# Patient Record
Sex: FEMALE | Race: WHITE | NOT HISPANIC OR LATINO | Employment: FULL TIME | URBAN - METROPOLITAN AREA
[De-identification: names, ages, dates, MRNs, and addresses within clinical notes are randomized per-mention and may not be internally consistent; named-entity substitution may affect disease eponyms.]

---

## 2024-08-12 ENCOUNTER — OFFICE VISIT (OUTPATIENT)
Dept: INTERNAL MEDICINE | Age: 54
End: 2024-08-12
Payer: COMMERCIAL

## 2024-08-12 VITALS
BODY MASS INDEX: 29.67 KG/M2 | WEIGHT: 184.6 LBS | TEMPERATURE: 97.6 F | DIASTOLIC BLOOD PRESSURE: 80 MMHG | SYSTOLIC BLOOD PRESSURE: 120 MMHG | HEIGHT: 66 IN | HEART RATE: 73 BPM | OXYGEN SATURATION: 97 %

## 2024-08-12 DIAGNOSIS — Z00.00 LABORATORY EXAMINATION ORDERED AS PART OF A ROUTINE GENERAL MEDICAL EXAMINATION: ICD-10-CM

## 2024-08-12 DIAGNOSIS — D22.9 MULTIPLE NEVI: ICD-10-CM

## 2024-08-12 DIAGNOSIS — Z12.4 SCREENING FOR CERVICAL CANCER: ICD-10-CM

## 2024-08-12 DIAGNOSIS — Z12.31 SCREENING MAMMOGRAM FOR BREAST CANCER: ICD-10-CM

## 2024-08-12 DIAGNOSIS — Z80.0 FAMILY HISTORY OF COLON CANCER IN FATHER: ICD-10-CM

## 2024-08-12 DIAGNOSIS — R31.29 OTHER MICROSCOPIC HEMATURIA: ICD-10-CM

## 2024-08-12 DIAGNOSIS — E55.9 VITAMIN D DEFICIENCY: ICD-10-CM

## 2024-08-12 DIAGNOSIS — Z13.6 ENCOUNTER FOR SCREENING FOR CARDIOVASCULAR DISORDERS: ICD-10-CM

## 2024-08-12 DIAGNOSIS — Z23 NEED FOR VACCINATION: ICD-10-CM

## 2024-08-12 DIAGNOSIS — G43.009 MIGRAINE WITHOUT AURA AND WITHOUT STATUS MIGRAINOSUS, NOT INTRACTABLE: ICD-10-CM

## 2024-08-12 DIAGNOSIS — I10 PRIMARY HYPERTENSION: Primary | ICD-10-CM

## 2024-08-12 LAB
BILIRUB BLD-MCNC: NEGATIVE MG/DL
CLARITY, POC: CLEAR
COLOR UR: YELLOW
EXPIRATION DATE: NORMAL
GLUCOSE UR STRIP-MCNC: NEGATIVE MG/DL
KETONES UR QL: NEGATIVE
LEUKOCYTE EST, POC: NEGATIVE
Lab: NORMAL
NITRITE UR-MCNC: NEGATIVE MG/ML
PH UR: 6 [PH] (ref 5–8)
PROT UR STRIP-MCNC: NEGATIVE MG/DL
RBC # UR STRIP: NEGATIVE /UL
SP GR UR: 1.02 (ref 1–1.03)
UROBILINOGEN UR QL: NORMAL

## 2024-08-12 PROCEDURE — 90715 TDAP VACCINE 7 YRS/> IM: CPT | Performed by: INTERNAL MEDICINE

## 2024-08-12 PROCEDURE — 90471 IMMUNIZATION ADMIN: CPT | Performed by: INTERNAL MEDICINE

## 2024-08-12 PROCEDURE — 99204 OFFICE O/P NEW MOD 45 MIN: CPT | Performed by: INTERNAL MEDICINE

## 2024-08-12 PROCEDURE — 81003 URINALYSIS AUTO W/O SCOPE: CPT | Performed by: INTERNAL MEDICINE

## 2024-08-12 RX ORDER — ZOLMITRIPTAN 5 MG/1
TABLET, FILM COATED ORAL
COMMUNITY
Start: 2024-07-30

## 2024-08-12 RX ORDER — ATOGEPANT 60 MG/1
60 TABLET ORAL DAILY
COMMUNITY
Start: 2024-07-12

## 2024-08-12 RX ORDER — RIZATRIPTAN BENZOATE 10 MG/1
TABLET ORAL
COMMUNITY
Start: 2024-07-30

## 2024-08-12 RX ORDER — TOPIRAMATE 50 MG/1
2 TABLET, FILM COATED ORAL DAILY
COMMUNITY
Start: 2024-06-05

## 2024-08-12 RX ORDER — RIMEGEPANT SULFATE 75 MG/75MG
75 TABLET, ORALLY DISINTEGRATING ORAL
COMMUNITY
Start: 2024-06-02

## 2024-08-12 RX ORDER — IBUPROFEN 800 MG/1
1 TABLET ORAL EVERY 8 HOURS PRN
COMMUNITY
Start: 2024-07-12

## 2024-08-12 NOTE — ASSESSMENT & PLAN NOTE
Felt to be due to kidney stones in July 2024  Repeat UA today August 12, 2024 negative for blood in urine

## 2024-08-12 NOTE — ASSESSMENT & PLAN NOTE
Patient states she gets her skin checked yearly by dermatologist and [place]-states has had only 1 nevi removed in the past and negative    Plan refer to [facility] for routine skin check

## 2024-08-12 NOTE — PATIENT INSTRUCTIONS
Refer to gyn, derm   Get mammogram  For colonoscopy this fall -in [place]  Give Tdap today  Shingrix 2nd dose in Dec/jan 2025

## 2024-08-12 NOTE — ASSESSMENT & PLAN NOTE
Migraine headaches stable sees Dr. Jacobs regularly     Plan-continue with preventative medicine Qulipta 60 mg daily and Topamax 50 mg 2 tablets daily  Uses Nurtec, Zomig, Maxalt interchangeably for her headaches  Follow-up with Dr. Jacobs

## 2024-08-12 NOTE — PROGRESS NOTES
CHIEF COMPLAINT  Brenda Beasley presents to [facility] to Establish Care and Hypertension (Patient does have a list of reading with her of reading went to urgent care and patient had some kidney stones)     HPI  53-year-old patient here to establish care    BP--checked it at home-136//82--112-123/77-88-IBW- <170 lbs    Had UTI recently 2 weeks [id number] kidney stones-given macrobid-and been drinking lots of fluids/cut off diet cokes and drinking cranberry juice-no more LBP-had some urinary frequency/pressure/no dysuria    Past medical history hypertension,migraine headaches x 2nd grade-on Qlipta, Topamax and zomig/nurtec/maxalt -sees Dr Jacobs,  colon Cancer-dad was 34 yo-last one 2019-repeat in 5 yrs    -dad colon cancer-at age of 35 years oldFor cscope in fall 2024    SH-  and recently  2022 , no cigs, no ETOH, no drugs  Moved from [place] recently-in 2022 got  2022-works for [employer]-kids one in [place] and other in [place]      Past History:  Allergies: Patient has no known allergies.   Medical History: has no past medical history on file.   Surgical History: has no past surgical history on file.   Family History: family history is not on file.   Social History:   Social History     Social History Narrative    Not on file         Current Outpatient Medications:     ibuprofen (ADVIL,MOTRIN) 800 MG tablet, Take 1 tablet by mouth Every 8 (Eight) Hours As Needed., Disp: , Rfl:     Nurtec 75 MG dispersible tablet, 1 tablet. prn, Disp: , Rfl:     Qulipta 60 MG tablet, Take 1 tablet by mouth Daily. prn, Disp: , Rfl:     rizatriptan (MAXALT) 10 MG tablet, , Disp: , Rfl:     topiramate (TOPAMAX) 50 MG tablet, Take 2 tablets by mouth Daily., Disp: , Rfl:     ZOLMitriptan (ZOMIG) 5 MG tablet, prn, Disp: , Rfl:      OBJECTIVE  Vital Signs  Vitals:    08/12/24 0813 08/12/24 0907   BP: 122/90 120/80   BP Location: Left arm Right  "arm   Patient Position: Sitting Sitting   Cuff Size: Small Adult Adult   Pulse: 73    Temp: 97.6 °F (36.4 °C)    TempSrc: Temporal    SpO2: 97%    Weight: 83.7 kg (184 lb 9.6 oz)    Height: 167.6 cm (66\")       Body mass index is 29.8 kg/m².      Physical Exam  Vitals and nursing note reviewed.   Constitutional:       Appearance: Normal appearance.   HENT:      Head: Normocephalic and atraumatic.   Cardiovascular:      Rate and Rhythm: Normal rate and regular rhythm.   Pulmonary:      Effort: Pulmonary effort is normal.      Breath sounds: Normal breath sounds.   Abdominal:      Palpations: Abdomen is soft.   Musculoskeletal:      Cervical back: Normal range of motion and neck supple.   Neurological:      General: No focal deficit present.      Mental Status: She is alert and oriented to person, place, and time.         RESULTS REVIEW  No results found for: \"PROBNP\", \"BNP\"    CBC w/diff          1/11/2024    14:28   CBC w/Diff   WBC 11.69       RBC 4.77       Hemoglobin 14.7       Hematocrit 45.1       MCV 94.5       MCH 30.8       MCHC 32.6       RDW 12.4       Platelets 379       Neutrophil Rel % 72.6       Immature Granulocyte Rel % 0.6       Lymphocyte Rel % 19.6       Monocyte Rel % 5.8       Eosinophil Rel % 1.1       Basophil Rel % 0.3          Details          This result is from an external source.                 No results found for: \"TSH\"   No results found for: \"FREET4\"         No Images in the past 120 days found..              ASSESSMENT & PLAN  Diagnoses and all orders for this visit:    1. Primary hypertension (Primary)  Assessment & Plan:  Hypertension-stable without meds-BP at clinic equals 120/90 and on repeat exam 120/80  BP--checked it at home-136//82--112-123/77-88    Plan-discussed hypertension and complications goal blood pressures less than 130/80  Ideal body weight is less than 70 pounds  Continue to follow low-salt diet patient rarely eats out as fast foods  Has cut back on drinking " Manpreet corbin      Orders:  -     Comprehensive Metabolic Panel; Future  -     Lipid Panel; Future  -     TSH+Free T4; Future  -     T3, Free; Future  -     Vitamin B12; Future  -     Folate; Future  -     Magnesium; Future  -     Vitamin D,25-Hydroxy; Future  -     CBC & Differential; Future  -     MicroAlbumin, Urine, Random - Urine, Clean Catch; Future    2. Migraine without aura and without status migrainosus, not intractable  Assessment & Plan:  Migraine headaches stable sees Dr. Jacobs regularly     Plan-continue with preventative medicine Qulipta 60 mg daily and Topamax 50 mg 2 tablets daily  Uses Nurtec, Zomig, Maxalt interchangeably for her headaches  Follow-up with Dr. Jacobs            Orders:  -     Comprehensive Metabolic Panel; Future  -     Lipid Panel; Future  -     TSH+Free T4; Future  -     T3, Free; Future  -     Vitamin B12; Future  -     Folate; Future  -     Magnesium; Future  -     Vitamin D,25-Hydroxy; Future  -     CBC & Differential; Future  -     MicroAlbumin, Urine, Random - Urine, Clean Catch; Future    3. Family history of colon cancer in father  Comments:  at 36 yo- pt with neg gene-has one scheduled fall 2024--last one 2019 and neg at [facility]-Dr Dempsey  Assessment & Plan:  Dad diagnosed with colon cancer-at the age of 35    Plan-last colonoscopy was normal 2019 by -recommend repeat in 5 years  No change in bowel movements no blood in stool  Has colonoscopy scheduled this fall 2024    Orders:  -     Comprehensive Metabolic Panel; Future  -     Lipid Panel; Future  -     TSH+Free T4; Future  -     T3, Free; Future  -     Vitamin B12; Future  -     Folate; Future  -     Magnesium; Future  -     Vitamin D,25-Hydroxy; Future  -     CBC & Differential; Future  -     MicroAlbumin, Urine, Random - Urine, Clean Catch; Future    4. Screening mammogram for breast cancer  -     Mammo Screening Digital Tomosynthesis Bilateral With CAD; Future  -     Comprehensive Metabolic Panel; Future  -      Lipid Panel; Future  -     TSH+Free T4; Future  -     T3, Free; Future  -     Vitamin B12; Future  -     Folate; Future  -     Magnesium; Future  -     Vitamin D,25-Hydroxy; Future  -     CBC & Differential; Future  -     MicroAlbumin, Urine, Random - Urine, Clean Catch; Future    5. Screening for cervical cancer  -     Ambulatory Referral to Obstetrics / Gynecology  -     Comprehensive Metabolic Panel; Future  -     Lipid Panel; Future  -     TSH+Free T4; Future  -     T3, Free; Future  -     Vitamin B12; Future  -     Folate; Future  -     Magnesium; Future  -     Vitamin D,25-Hydroxy; Future  -     CBC & Differential; Future  -     MicroAlbumin, Urine, Random - Urine, Clean Catch; Future    6. Multiple nevi  Assessment & Plan:  Patient states she gets her skin checked yearly by dermatologist and requesting appointment with Derm here in [facility]-states has had only 1 nevi removed in the past and negative    Plan [facility] to [facility] for [facility] skin check    Orders:  -     Ambulatory Referral to Dermatology  -     Comprehensive Metabolic Panel; Future  -     Lipid Panel; Future  -     TSH+Free T4; Future  -     T3, Free; Future  -     Vitamin B12; Future  -     Folate; Future  -     Magnesium; Future  -     Vitamin D,25-Hydroxy; Future  -     CBC & Differential; Future  -     MicroAlbumin, Urine, Random - Urine, Clean Catch; Future    7. Need for vaccination  -     Tdap Vaccine => 8yo IM (BOOSTRIX/ADACEL)  -     Comprehensive Metabolic Panel; Future  -     Lipid Panel; Future  -     TSH+Free T4; Future  -     T3, Free; Future  -     Vitamin B12; Future  -     Folate; Future  -     Magnesium; Future  -     Vitamin D,25-Hydroxy; Future  -     CBC & Differential; Future  -     MicroAlbumin, Urine, Random - Urine, Clean Catch; Future    8. Other microscopic hematuria  Assessment & Plan:  Felt to be due to kidney stones in July 2024  Repeat UA today August 12, 2024 negative for blood in urine    Orders:  -      POCT urinalysis dipstick, automated  -     Comprehensive Metabolic Panel; Future  -     Lipid Panel; Future  -     TSH+Free T4; Future  -     T3, Free; Future  -     Vitamin B12; Future  -     Folate; Future  -     Magnesium; Future  -     Vitamin D,25-Hydroxy; Future  -     CBC & Differential; Future  -     MicroAlbumin, Urine, Random - Urine, Clean Catch; Future    9. Vitamin D deficiency  -     Comprehensive Metabolic Panel; Future  -     Lipid Panel; Future  -     TSH+Free T4; Future  -     T3, Free; Future  -     Vitamin B12; Future  -     Folate; Future  -     Magnesium; Future  -     Vitamin D,25-Hydroxy; Future  -     CBC & Differential; Future  -     MicroAlbumin, Urine, Random - Urine, Clean Catch; Future    10. Encounter for screening for cardiovascular disorders  -     Comprehensive Metabolic Panel; Future  -     Lipid Panel; Future  -     TSH+Free T4; Future  -     T3, Free; Future  -     Vitamin B12; Future  -     Folate; Future  -     Magnesium; Future  -     Vitamin D,25-Hydroxy; Future  -     CBC & Differential; Future  -     MicroAlbumin, Urine, Random - Urine, Clean Catch; Future    11. Laboratory examination ordered as part of a routine general medical examination  -     Comprehensive Metabolic Panel; Future  -     Lipid Panel; Future  -     TSH+Free T4; Future  -     T3, Free; Future  -     Vitamin B12; Future  -     Folate; Future  -     Magnesium; Future  -     Vitamin D,25-Hydroxy; Future  -     CBC & Differential; Future  -     MicroAlbumin, Urine, Random - Urine, Clean Catch; Future         BMI is >= 25 and <30. (Overweight) The following options were offered after discussion;: weight loss educational material (shared in after visit summary), exercise counseling/recommendations, and nutrition counseling/recommendations       Patient Instructions   Refer to gyn, derm   Get mammogram  For colonoscopy this fall -in [place]  Give Tdap today  Shingrix 2nd dose in Dec/jan 2025   IBW <170  lbs  Goal blood pressure is less than 130/80    FOLLOW UP  Return in about 4 months (around 12/12/2024) for Recheck, Annual physical.  Labs recheck BP    Patient was given instructions and counseling regarding her condition or for health maintenance advice. Please see specific information pulled into the AVS if appropriate.

## 2024-08-12 NOTE — ASSESSMENT & PLAN NOTE
Dad diagnosed with colon cancer-at the age of 35    Plan-last colonoscopy was normal 2019 by -recommend repeat in 5 years  No change in bowel movements no blood in stool  Has colonoscopy scheduled this fall 2024

## 2024-08-15 ENCOUNTER — PATIENT ROUNDING (BHMG ONLY) (OUTPATIENT)
Dept: INTERNAL MEDICINE | Age: 54
End: 2024-08-15
Payer: COMMERCIAL

## 2025-02-26 NOTE — ASSESSMENT & PLAN NOTE
Goal Outcome Evaluation:  Plan of Care Reviewed With: patient        Progress: no change  Outcome Evaluation: Patient does not present with any significant decline in functional mobility. She is currently completing all transfers and ambulating independently and is safe to continue doing so until her discharge from the hospital. She is safe to return home once medically able. She is in agreement and will be discharged from PT caseload.    Anticipated Discharge Disposition (PT): home                         Hypertension-stable without meds-BP at clinic equals 120/90 and on repeat exam 120/80  BP--checked it at home-136//82--112-123/77-88    Plan-discussed hypertension and complications goal blood pressures less than 130/80  Ideal body weight is less than 70 pounds  Continue to follow low-salt diet patient rarely eats out as fast foods  Has cut back on drinking Coke sodas